# Patient Record
Sex: FEMALE | Race: WHITE | NOT HISPANIC OR LATINO | Employment: UNEMPLOYED | URBAN - METROPOLITAN AREA
[De-identification: names, ages, dates, MRNs, and addresses within clinical notes are randomized per-mention and may not be internally consistent; named-entity substitution may affect disease eponyms.]

---

## 2017-06-30 ENCOUNTER — OFFICE VISIT (OUTPATIENT)
Dept: OBSTETRICS AND GYNECOLOGY | Facility: CLINIC | Age: 42
End: 2017-06-30
Payer: MEDICAID

## 2017-06-30 ENCOUNTER — LAB VISIT (OUTPATIENT)
Dept: LAB | Facility: HOSPITAL | Age: 42
End: 2017-06-30
Attending: OBSTETRICS & GYNECOLOGY
Payer: MEDICAID

## 2017-06-30 VITALS
HEART RATE: 78 BPM | BODY MASS INDEX: 53.92 KG/M2 | WEIGHT: 293 LBS | SYSTOLIC BLOOD PRESSURE: 120 MMHG | DIASTOLIC BLOOD PRESSURE: 82 MMHG | RESPIRATION RATE: 10 BRPM | HEIGHT: 62 IN

## 2017-06-30 DIAGNOSIS — Z01.419 WELL WOMAN EXAM WITH ROUTINE GYNECOLOGICAL EXAM: Primary | ICD-10-CM

## 2017-06-30 DIAGNOSIS — N94.6 DYSMENORRHEA: ICD-10-CM

## 2017-06-30 DIAGNOSIS — Z12.4 CERVICAL CANCER SCREENING: ICD-10-CM

## 2017-06-30 DIAGNOSIS — N92.0 MENORRHAGIA WITH REGULAR CYCLE: ICD-10-CM

## 2017-06-30 DIAGNOSIS — Z11.3 SCREEN FOR STD (SEXUALLY TRANSMITTED DISEASE): ICD-10-CM

## 2017-06-30 DIAGNOSIS — Z12.31 OTHER SCREENING MAMMOGRAM: ICD-10-CM

## 2017-06-30 LAB
BASOPHILS # BLD AUTO: 0.01 K/UL
BASOPHILS NFR BLD: 0.1 %
C TRACH DNA SPEC QL NAA+PROBE: NOT DETECTED
CANDIDA RRNA VAG QL PROBE: NEGATIVE
DIFFERENTIAL METHOD: ABNORMAL
EOSINOPHIL # BLD AUTO: 0.1 K/UL
EOSINOPHIL NFR BLD: 0.9 %
ERYTHROCYTE [DISTWIDTH] IN BLOOD BY AUTOMATED COUNT: 15.7 %
G VAGINALIS RRNA GENITAL QL PROBE: POSITIVE
HCT VFR BLD AUTO: 41.9 %
HGB BLD-MCNC: 13.7 G/DL
LYMPHOCYTES # BLD AUTO: 2.3 K/UL
LYMPHOCYTES NFR BLD: 21.7 %
MCH RBC QN AUTO: 30 PG
MCHC RBC AUTO-ENTMCNC: 32.7 %
MCV RBC AUTO: 92 FL
MONOCYTES # BLD AUTO: 0.5 K/UL
MONOCYTES NFR BLD: 4.8 %
N GONORRHOEA DNA SPEC QL NAA+PROBE: NOT DETECTED
NEUTROPHILS # BLD AUTO: 7.8 K/UL
NEUTROPHILS NFR BLD: 72.5 %
PLATELET # BLD AUTO: 170 K/UL
PMV BLD AUTO: 12.5 FL
RBC # BLD AUTO: 4.56 M/UL
RPR SER QL: NORMAL
T VAGINALIS RRNA GENITAL QL PROBE: POSITIVE
TSH SERPL DL<=0.005 MIU/L-ACNC: 0.58 UIU/ML
WBC # BLD AUTO: 10.75 K/UL

## 2017-06-30 PROCEDURE — 88142 CYTOPATH C/V THIN LAYER: CPT | Performed by: PATHOLOGY

## 2017-06-30 PROCEDURE — 87591 N.GONORRHOEAE DNA AMP PROB: CPT

## 2017-06-30 PROCEDURE — 88141 CYTOPATH C/V INTERPRET: CPT | Mod: ,,, | Performed by: PATHOLOGY

## 2017-06-30 PROCEDURE — 87480 CANDIDA DNA DIR PROBE: CPT

## 2017-06-30 PROCEDURE — 99999 PR PBB SHADOW E&M-EST. PATIENT-LVL III: CPT | Mod: PBBFAC,,, | Performed by: OBSTETRICS & GYNECOLOGY

## 2017-06-30 PROCEDURE — 87624 HPV HI-RISK TYP POOLED RSLT: CPT

## 2017-06-30 PROCEDURE — 99213 OFFICE O/P EST LOW 20 MIN: CPT | Mod: PBBFAC | Performed by: OBSTETRICS & GYNECOLOGY

## 2017-06-30 PROCEDURE — 99396 PREV VISIT EST AGE 40-64: CPT | Mod: S$PBB,,, | Performed by: OBSTETRICS & GYNECOLOGY

## 2017-06-30 NOTE — PROGRESS NOTES
Subjective:    Patient ID: Robert Gordon is a 42 y.o. y.o. female.     Chief Complaint: Annual Well Woman Exam     History of Present Illness:  Robert presents today for Annual Well Woman exam. She describes her menses as very heavy with passage of clots; severe dysmenorrhea associated with nausea and fatigue.She admits to pelvic pain.  She denies breast tenderness, masses, nipple discharge. She denies difficulty with urination or bowel movements. She denies menopausal symptoms such as hotflashes, vaginal dryness, and night sweats. She denies bloating, early satiety, or weight changes. She is sexually active. Contraception is by bilateral tubal ligation.    Patient asymptomatic but requesting STD screening.       Menstrual History:   No LMP recorded..     OB History      Para Term  AB Living    2 2       2    SAB TAB Ectopic Multiple Live Births                       The following portions of the patient's history were reviewed and updated as appropriate: allergies, current medications, past family history, past medical history, past social history, past surgical history and problem list.    ROS:   CONSTITUTIONAL: Negative for fever, chills, diaphoresis, weakness, fatigue, weight loss, weight gain  ENT: negative for sore throat, nasal congestion, nasal discharge, epistaxis, tinnitus, hearing loss  EYES: negative for blurry vision, decreased vision, loss of vision, eye pain, diplopia, photophobia, discharge  SKIN: Negative for rash, itching, hives  RESPIRATORY: negative for cough, hemoptysis, shortness of breath, pleuritic chest pain, wheezing  CARDIOVASCULAR: negative for chest pain, dyspnea on exertion, orthopnea, paroxysmal nocturnal dyspnea, edema, palpitations  BREAST: negative for breast  tenderness, breast mass, nipple discharge, or skin changes  GASTROINTESTINAL: negative for abdominal pain, flank pain, nausea, vomiting, diarrhea, constipation, black stool, blood in stool  GENITOURINARY:  "negative for abnormal vaginal bleeding, amenorrhea, decreased libido, dysuria, genital sores, hematuria, incontinence, menorrhagia, pelvic pain, urinary frequency, vaginal discharge  HEMATOLOGIC/LYMPHATIC: negative for swollen lymph nodes, bleeding, bruising  MUSCULOSKELETAL: negative for back pain, joint pain, joint stiffness, joint swelling, muscle pain, muscle weakness  NEUROLOGICAL: negative for dizzy/vertigo, headache, focal weakness, numbness/tingling, speech problems, loss of consciousness, confusion, memory loss  BEHAVORIAL/PSYCH: negative for anxiety, depression, psychosis  ENDOCRINE: negative for polydipsia/polyuria, palpitations, skin changes, temperature intolerance, unexpected weight changes  ALLERGIC/IMMUNOLOGIC: negative for urticaria, hay fever, angioedema      Objective:    Vital Signs:  Vitals:    06/30/17 1040   BP: 120/82   Pulse: 78   Resp: 10   Weight: (!) 147.9 kg (326 lb)   Height: 5' 2" (1.575 m)         Physical Exam:  General:  alert, cooperative, appears stated age   Skin:  Skin color, texture, turgor normal. No rashes or lesions   HEENT:  conjunctivae/corneas clear. PERRL.   Neck: supple, trachea midline, no adenopathy or thyromegally   Respiratory:  clear to auscultation bilaterally   Heart:  regular rate and rhythm, S1, S2 normal, no murmur, click, rub or gallop   Breasts:  no discharge, erythema, or tenderness   Abdomen:  normal findings: bowel sounds normal, no masses palpable, no organomegaly and soft, non-tender   Pelvis: External genitalia: normal general appearance  Urinary system: urethral meatus normal, bladder nontender  Vaginal: normal mucosa without prolapse or lesions, discharge, white  Cervix: normal appearance  Uterus: normal size, shape, position  Adnexa: normal size, nontender bilaterally   Extremities: Normal ROM; no edema, no cyanosis   Neurologial: Normal strength and tone. No focal numbness or weakness. Reflexes 2+ and equal.   Psychiatric: normal mood, speech, " dress, and thought processes         Assessment:       Healthy female exam.     1. Well woman exam with routine gynecological exam    2. Cervical cancer screening    3. Screen for STD (sexually transmitted disease)    4. Other screening mammogram    5. Menorrhagia with regular cycle    6. Dysmenorrhea          Plan:       Thin prep Pap smear.    HPV cotesting  MMG ordered  Affirm  GC/CT  CBC/TSH  HIV/Hep/RPR  TVUS  RTC for EMB    COUNSELING:  Robert was counseled on STD pevention, use and side-effects of various contraceptive measures, A.C.O.G. Pap guidelines and recommendations for yearly pelvic exams in addition to recommendations for monthly self breast exams; to see her PCP for other health maintenance.

## 2017-07-03 LAB
HAV IGM SERPL QL IA: NEGATIVE
HBV CORE IGM SERPL QL IA: NEGATIVE
HBV SURFACE AG SERPL QL IA: NEGATIVE
HCV AB SERPL QL IA: NEGATIVE
HIV 1+2 AB+HIV1 P24 AG SERPL QL IA: NEGATIVE

## 2017-07-05 ENCOUNTER — TELEPHONE (OUTPATIENT)
Dept: OBSTETRICS AND GYNECOLOGY | Facility: CLINIC | Age: 42
End: 2017-07-05

## 2017-07-05 RX ORDER — METRONIDAZOLE 500 MG/1
TABLET ORAL
Qty: 16 TABLET | Refills: 0 | Status: SHIPPED | OUTPATIENT
Start: 2017-07-05 | End: 2020-09-22

## 2017-07-05 NOTE — TELEPHONE ENCOUNTER
----- Message from Georgie Hassan MA sent at 7/5/2017  2:23 PM CDT -----  Contact: brandt Gordon  MRN: [id number]  Home Phone      [phone]  Work Phone      Not on file.  Mobile          [phone]    Patient Care Team:  Katie Antony NP as PCP - General (Family Medicine)  OB? No  What phone number can you be reached at?   [phone]  Message:   Needs more information on test results.

## 2017-07-07 LAB
HPV HR 12 DNA CVX QL NAA+PROBE: POSITIVE
HPV16 DNA SPEC QL NAA+PROBE: NEGATIVE
HPV18 DNA SPEC QL NAA+PROBE: NEGATIVE

## 2020-08-07 DIAGNOSIS — M25.551 BILATERAL HIP PAIN: Primary | ICD-10-CM

## 2020-08-07 DIAGNOSIS — M25.552 BILATERAL HIP PAIN: Primary | ICD-10-CM

## 2020-09-18 DIAGNOSIS — M25.562 PAIN IN BOTH KNEES, UNSPECIFIED CHRONICITY: ICD-10-CM

## 2020-09-18 DIAGNOSIS — M25.511 BILATERAL SHOULDER PAIN, UNSPECIFIED CHRONICITY: Primary | ICD-10-CM

## 2020-09-18 DIAGNOSIS — M25.561 PAIN IN BOTH KNEES, UNSPECIFIED CHRONICITY: ICD-10-CM

## 2020-09-18 DIAGNOSIS — M25.512 BILATERAL SHOULDER PAIN, UNSPECIFIED CHRONICITY: Primary | ICD-10-CM

## 2020-09-22 ENCOUNTER — OFFICE VISIT (OUTPATIENT)
Dept: ORTHOPEDICS | Facility: CLINIC | Age: 45
End: 2020-09-22
Payer: MEDICAID

## 2020-09-22 ENCOUNTER — HOSPITAL ENCOUNTER (OUTPATIENT)
Dept: RADIOLOGY | Facility: HOSPITAL | Age: 45
Discharge: HOME | End: 2020-09-22
Attending: PHYSICIAN ASSISTANT
Payer: MEDICAID

## 2020-09-22 VITALS
HEIGHT: 62 IN | TEMPERATURE: 97 F | HEART RATE: 90 BPM | DIASTOLIC BLOOD PRESSURE: 86 MMHG | WEIGHT: 293 LBS | SYSTOLIC BLOOD PRESSURE: 134 MMHG | BODY MASS INDEX: 53.92 KG/M2 | RESPIRATION RATE: 18 BRPM

## 2020-09-22 DIAGNOSIS — M25.512 BILATERAL SHOULDER PAIN, UNSPECIFIED CHRONICITY: ICD-10-CM

## 2020-09-22 DIAGNOSIS — M25.511 BILATERAL SHOULDER PAIN, UNSPECIFIED CHRONICITY: ICD-10-CM

## 2020-09-22 DIAGNOSIS — M25.562 PAIN IN BOTH KNEES, UNSPECIFIED CHRONICITY: ICD-10-CM

## 2020-09-22 DIAGNOSIS — M25.561 PAIN IN BOTH KNEES, UNSPECIFIED CHRONICITY: ICD-10-CM

## 2020-09-22 DIAGNOSIS — M25.562 CHRONIC PAIN OF BOTH KNEES: Primary | ICD-10-CM

## 2020-09-22 DIAGNOSIS — M54.2 NECK PAIN: ICD-10-CM

## 2020-09-22 DIAGNOSIS — M25.561 CHRONIC PAIN OF BOTH KNEES: Primary | ICD-10-CM

## 2020-09-22 DIAGNOSIS — M17.0 PRIMARY OSTEOARTHRITIS OF BOTH KNEES: ICD-10-CM

## 2020-09-22 DIAGNOSIS — G89.29 CHRONIC PAIN OF BOTH KNEES: Primary | ICD-10-CM

## 2020-09-22 PROCEDURE — 73564 X-RAY EXAM KNEE 4 OR MORE: CPT | Mod: 26,50,, | Performed by: RADIOLOGY

## 2020-09-22 PROCEDURE — 99204 PR OFFICE/OUTPT VISIT, NEW, LEVL IV, 45-59 MIN: ICD-10-PCS | Mod: S$PBB,,, | Performed by: PHYSICIAN ASSISTANT

## 2020-09-22 PROCEDURE — 99999 PR PBB SHADOW E&M-EST. PATIENT-LVL IV: CPT | Mod: PBBFAC,,, | Performed by: PHYSICIAN ASSISTANT

## 2020-09-22 PROCEDURE — 73030 X-RAY EXAM OF SHOULDER: CPT | Mod: 26,50,, | Performed by: RADIOLOGY

## 2020-09-22 PROCEDURE — 99204 OFFICE O/P NEW MOD 45 MIN: CPT | Mod: S$PBB,,, | Performed by: PHYSICIAN ASSISTANT

## 2020-09-22 PROCEDURE — 73564 X-RAY EXAM KNEE 4 OR MORE: CPT | Mod: TC,50

## 2020-09-22 PROCEDURE — 99999 PR PBB SHADOW E&M-EST. PATIENT-LVL IV: ICD-10-PCS | Mod: PBBFAC,,, | Performed by: PHYSICIAN ASSISTANT

## 2020-09-22 PROCEDURE — 99214 OFFICE O/P EST MOD 30 MIN: CPT | Mod: PBBFAC,25 | Performed by: PHYSICIAN ASSISTANT

## 2020-09-22 PROCEDURE — 73030 XR SHOULDER COMPLETE 2 OR MORE VIEWS BILATERAL: ICD-10-PCS | Mod: 26,50,, | Performed by: RADIOLOGY

## 2020-09-22 PROCEDURE — 73564 XR KNEE ORTHO BILAT WITH FLEXION: ICD-10-PCS | Mod: 26,50,, | Performed by: RADIOLOGY

## 2020-09-22 PROCEDURE — 73030 X-RAY EXAM OF SHOULDER: CPT | Mod: TC,50

## 2020-09-22 RX ORDER — INSULIN GLARGINE 100 [IU]/ML
INJECTION, SOLUTION SUBCUTANEOUS
COMMUNITY
Start: 2020-08-19 | End: 2021-06-09

## 2020-09-22 RX ORDER — DICLOFENAC SODIUM 10 MG/G
4 GEL TOPICAL 4 TIMES DAILY
Qty: 1 TUBE | Refills: 1 | Status: SHIPPED | OUTPATIENT
Start: 2020-09-22 | End: 2021-07-27

## 2020-09-22 RX ORDER — LINAGLIPTIN 5 MG/1
TABLET, FILM COATED ORAL
COMMUNITY
Start: 2020-08-24 | End: 2021-06-09

## 2020-09-22 RX ORDER — GABAPENTIN 100 MG/1
CAPSULE ORAL
COMMUNITY
Start: 2020-09-03 | End: 2020-09-22 | Stop reason: SDUPTHER

## 2020-09-22 RX ORDER — METFORMIN HYDROCHLORIDE 500 MG/1
TABLET, EXTENDED RELEASE ORAL
COMMUNITY
Start: 2020-08-31 | End: 2021-06-09

## 2020-09-22 RX ORDER — NITROGLYCERIN 0.4 MG/1
0.4 TABLET SUBLINGUAL EVERY 5 MIN PRN
COMMUNITY
Start: 2020-07-29

## 2020-09-22 RX ORDER — LOSARTAN POTASSIUM 25 MG/1
TABLET ORAL
COMMUNITY
Start: 2020-07-29 | End: 2021-06-09 | Stop reason: DRUGHIGH

## 2020-09-22 RX ORDER — TRAMADOL HYDROCHLORIDE 50 MG/1
TABLET ORAL
COMMUNITY
Start: 2020-09-18 | End: 2020-10-15

## 2020-09-22 RX ORDER — GABAPENTIN 300 MG/1
CAPSULE ORAL
COMMUNITY
Start: 2020-09-15 | End: 2021-06-09

## 2020-09-22 RX ORDER — KETOROLAC TROMETHAMINE 10 MG/1
TABLET, FILM COATED ORAL
COMMUNITY
Start: 2020-09-15 | End: 2020-09-22

## 2020-09-22 NOTE — LETTER
September 22, 2020      Katie Antony, NP  [address]  [place]  [place] [address] [place] [address] Spec. - Orthopedics  [address] [facility] [address]  Phone: [phone]          Patient: Robert Boo   MR Number: [id number]   YOB: 1975   Date of Visit: 9/22/2020       Dear Katie Antony:    Thank you for referring Robert Boo to me for evaluation. Attached you will find relevant portions of my assessment and plan of care.    If you have questions, please do not hesitate to call me. I look forward to following Robert Boo along with you.    Sincerely,    DANIELLE Watkinsosure  CC:  No Recipients    If you would like to receive this communication electronically, please contact [email or web address] or [phone] to request more information on [employer] access.    For providers and/or their staff who would like to refer a patient to [facility], please contact us through our one-stop-shop provider referral line, [facility] , at [phone].    If you feel you have received this communication in error or would no longer like to receive these types of communications, please e-mail [email or web address]

## 2020-09-22 NOTE — PROGRESS NOTES
Subjective:      Patient ID: Robert Boo is a 45 y.o. female.    Chief Complaint: Pain of the Left Knee, Pain of the Right Knee, Pain of the Right Shoulder, and Pain of the Left Shoulder    Review of patient's allergies indicates:   Allergen Reactions    Penicillins Other (See Comments)     Unknown reaction      44 yo F presents to clinic with c/o bilateral shoulder pain and bilateral knee pain.  No injury or trauma.    Shoulder pain started about 6 months ago.  Also has neck pain that radiates into shoulders and down to fingertips.  Pain is sharp/burning.  Does have some numbness in right hand.  Diagnosed with neuropathy by PCP and started gabapentin which has not provided much relief.  States that she has tried physical therapy in the past with no improvement.    Bilateral knee pain x 2 years.  Pain is sharp/achy and worse with walking and standing, improves some with rest.  Also worse after sitting for prolonged periods of time.  Pain does not radiate, no numbness or tingling in lower extremities.  Sometimes feels like knees will give out.    Taking ibuprofen daily.  Has tried mobic, toradol, tramadol with no relief.      Review of Systems   Constitution: Negative for chills, diaphoresis and fever.   HENT: Negative for congestion, ear discharge and ear pain.    Eyes: Negative for blurred vision, discharge, double vision and pain.   Cardiovascular: Negative for chest pain, claudication and cyanosis.   Respiratory: Negative for cough, hemoptysis and shortness of breath.    Endocrine: Negative for cold intolerance and heat intolerance.   Skin: Negative for color change, dry skin, itching and rash.   Musculoskeletal: Positive for joint pain and neck pain. Negative for arthritis, back pain, falls, gout, joint swelling and muscle weakness.   Gastrointestinal: Negative for abdominal pain and change in bowel habit.   Neurological: Positive for numbness and paresthesias. Negative for brief paralysis, disturbances in  coordination and dizziness.   Psychiatric/Behavioral: Negative for altered mental status and depression.         Objective:          General    Constitutional: She is oriented to person, place, and time. She appears well-developed and well-nourished. No distress.   HENT:   Head: Atraumatic.   Eyes: EOM are normal. Right eye exhibits no discharge. Left eye exhibits no discharge.   Cardiovascular: Normal rate.    Pulmonary/Chest: Effort normal. No respiratory distress.   Abdominal: Soft.   Neurological: She is alert and oriented to person, place, and time.   Psychiatric: She has a normal mood and affect. Her behavior is normal.     General Musculoskeletal Exam   Gait: antalgic       Right Knee Exam     Inspection   Erythema: absent  Scars: absent  Swelling: absent  Effusion: absent  Deformity: absent  Bruising: absent    Crepitus   The patient has crepitus of the patella and medial joint line.    Range of Motion   Extension: 0   Flexion: 130     Tests   Ligament Examination   MCL - Valgus: normal (0 to 2mm)  LCL - Varus: normal    Other   Sensation: normal    Left Knee Exam     Inspection   Erythema: absent  Scars: absent  Swelling: absent  Effusion: absent  Deformity: absent  Bruising: absent    Crepitus   The patient has crepitus of the patella and medial joint line.    Range of Motion   Extension: 0   Flexion: 140     Tests   Stability   MCL - Valgus: normal (0 to 2mm)  LCL - Varus: normal (0 to 2mm)    Other   Sensation: normalBack (L-Spine & T-Spine) / Neck (C-Spine) Exam     Tenderness   The patient is tender to palpation of the right trapezial and left trapezial.   Right Shoulder Exam     Inspection/Observation   Swelling: absent  Bruising: absent  Deformity: absent    Tenderness   The patient is tender to palpation of the supraspinatus.    Range of Motion   Active abduction: 150   Passive abduction: 160   Forward Flexion: 160   Internal rotation 0 degrees: Sacrum     Other   Sensation: normal    Comments:   Incomplete exam due to patient discomfort    Left Shoulder Exam     Inspection/Observation   Swelling: absent  Bruising: absent  Deformity: absent    Tenderness   The patient is tender to palpation of the supraspinatus.    Range of Motion   Active abduction: 130   Passive abduction: 140   Forward Flexion: 120   Internal rotation 0 degrees: Sacrum     Other   Sensation: normal     Comments:  Incomplete exam due to patient discomfort      Muscle Strength   Right Upper Extremity   Shoulder Abduction: 4/5   Left Upper Extremity  Shoulder Abduction: 4/5   Right Lower Extremity   Hip Abduction: 5/5   Quadriceps:  4/5   Hamstrin/5   Left Lower Extremity   Hip Abduction: 5/5   Quadriceps:  4/5   Hamstrin/5     Vascular Exam     Right Pulses  Dorsalis Pedis:      2+    Radial:                    2+      Left Pulses  Dorsalis Pedis:      2+    Radial:                    2+      Capillary Refill  Right Hand: normal capillary refill  Left Hand: normal capillary refill    Edema  Right Lower Leg: absent  Left Lower Leg: absent              Assessment:         Xray Bilateral Shoulders 20:  Right: Mild degenerative changes of the acromioclavicular joint.  The glenohumeral joint space is intact.  No fracture or dislocation.     Left: Mild degenerative changes of the acromioclavicular joint.  The glenohumeral joint space is intact.  No fracture or dislocation.      Xray Right Knee 20:  There is bilateral medial compartment joint space narrowing with subchondral sclerosis.  Minimal dorsal spurring from the patella bilaterally, right greater than left.  No fractures or dislocations.  No joint effusions.    Encounter Diagnoses   Name Primary?    Neck pain     Chronic pain of both knees Yes    Primary osteoarthritis of both knees     Chronic pain of both knees  -     diclofenac sodium (VOLTAREN) 1 % Gel; Apply 4 g topically 4 (four) times daily. As needed. Apply to bilateral knees.  Dispense: 1 Tube; Refill:  1    Neck pain  -     Ambulatory referral/consult to Pain Clinic; Future; Expected date: 09/29/2020    Primary osteoarthritis of both knees  -     diclofenac sodium (VOLTAREN) 1 % Gel; Apply 4 g topically 4 (four) times daily. As needed. Apply to bilateral knees.  Dispense: 1 Tube; Refill: 1               Plan:         The total face-to-face encounter time with this patient was 40 minutes and greater than 50% of of the encounter time was spent counseling the patient, coordinating care, and education regarding the pathology and natural history of her diagnosis. We have discussed a variety of treatment options including medications, injections, physical therapy and other alternative treatments. Pt is requesting CSI, however diabetes is not controlled as she states that recent A1c was 10.7.    I made the decision to obtain old records of the patient including previous notes and imaging. New imaging was ordered today of the extremity or extremities evaluated. I independently reviewed and interpreted the radiographs and/or MRIs today as well as prior imaging.      She was reminded to call the clinic immediately for any adverse side effects as explained in clinic today.    1. Referral to pain management for evaluation and treatment of neck pain.  2. HEP 42672 - I instructed and demonstrated a patellofemoral/periscapular and rotator cuff strengthening HEP. The patient then demonstrated understanding of exercises and proper technique. This program was performed for 10 minutes.   3. Referral to neurology for evaluation of finger numbness and stated diagnosis of neuropathy.  4. Patient will work on controlling diabetes, states that she is starting new medication today. When controlled, we can proceed with knee injections.  5. Voltaren gel topically as prescribed as needed. Patient is aware that she is not to take any other antiinflammatories while taking.  6. Ice compress to the affected area 2-3x a day for 15-20 minutes as  needed for pain management.  7. To ER if symptoms persist/worsen or if develop any new signs or symptoms.  8. RTC in 6 weeks for follow-up, sooner if needed.    Patient voices understanding of and agreement with treatment plan. All of the patient's questions were answered and the patient will contact us if she has any questions or concerns in the interim.

## 2020-10-14 ENCOUNTER — TELEPHONE (OUTPATIENT)
Dept: ORTHOPEDICS | Facility: CLINIC | Age: 45
End: 2020-10-14

## 2020-10-14 NOTE — TELEPHONE ENCOUNTER
----- Message from Francine Stafford sent at 10/14/2020  9:07 AM CDT -----  Contact: PATIENT  Robert Boo  MRN: [id number]  : 1975  PCP: Katie Antony  Home Phone      [phone]  Work Phone      Not on file.  Mobile          [phone]      MESSAGE: Patient states that Romelia prescribed a pain cream for her and it is not working, she is still having severe pain to where she can hardly walk.        Phone: [phone]

## 2020-10-14 NOTE — TELEPHONE ENCOUNTER
Pt states that she has had a recent A1C but states that she discussed with provider during last visit that she does not want any steroid injections. Please advise.

## 2020-10-15 DIAGNOSIS — M25.562 CHRONIC PAIN OF BOTH KNEES: Primary | ICD-10-CM

## 2020-10-15 DIAGNOSIS — G89.29 CHRONIC PAIN OF BOTH KNEES: Primary | ICD-10-CM

## 2020-10-15 DIAGNOSIS — M25.561 CHRONIC PAIN OF BOTH KNEES: Primary | ICD-10-CM

## 2020-10-15 DIAGNOSIS — M17.0 PRIMARY OSTEOARTHRITIS OF BOTH KNEES: ICD-10-CM

## 2020-10-15 RX ORDER — TRAMADOL HYDROCHLORIDE 50 MG/1
50 TABLET ORAL EVERY 8 HOURS PRN
Qty: 12 TABLET | Refills: 0 | Status: SHIPPED | OUTPATIENT
Start: 2020-10-15 | End: 2021-06-09

## 2020-10-15 NOTE — TELEPHONE ENCOUNTER
Offered pt the short course of tramdol, pt states she will take that but she knows that it will not help. Then offered PT and pt states that she will not try PT because she knows that it will make her feel worse. Informed pt that she should contact PCP for further pain treatment options.

## 2020-11-19 ENCOUNTER — TELEPHONE (OUTPATIENT)
Dept: PAIN MEDICINE | Facility: CLINIC | Age: 45
End: 2020-11-19

## 2020-11-19 NOTE — TELEPHONE ENCOUNTER
Patient was notified that our clinic is not currently accepting medicaid for pain management. Understanding was voiced.

## 2020-11-19 NOTE — TELEPHONE ENCOUNTER
----- Message from Elo Cortés sent at 11/19/2020  1:56 PM CST -----  Regarding: Patient Call Back  Who Called: BARRY GONZALES [[id number]]    What is the request in detail: Would like a call back in regards to getting scheduled referral in patient Epic.    Can the clinic reply by  [facility]? No     Best Call Back Number: [phone]

## 2021-02-09 ENCOUNTER — TELEPHONE (OUTPATIENT)
Dept: ORTHOPEDICS | Facility: CLINIC | Age: 46
End: 2021-02-09

## 2021-07-27 ENCOUNTER — OFFICE VISIT (OUTPATIENT)
Dept: ORTHOPEDICS | Facility: CLINIC | Age: 46
End: 2021-07-27
Payer: MEDICAID

## 2021-07-27 VITALS
DIASTOLIC BLOOD PRESSURE: 70 MMHG | OXYGEN SATURATION: 98 % | WEIGHT: 293 LBS | BODY MASS INDEX: 50.02 KG/M2 | RESPIRATION RATE: 20 BRPM | HEART RATE: 83 BPM | HEIGHT: 64 IN | SYSTOLIC BLOOD PRESSURE: 122 MMHG

## 2021-07-27 DIAGNOSIS — M17.0 PRIMARY OSTEOARTHRITIS OF BOTH KNEES: Primary | ICD-10-CM

## 2021-07-27 PROCEDURE — 99213 OFFICE O/P EST LOW 20 MIN: CPT | Mod: S$PBB,,, | Performed by: PHYSICIAN ASSISTANT

## 2021-07-27 PROCEDURE — 99213 PR OFFICE/OUTPT VISIT, EST, LEVL III, 20-29 MIN: ICD-10-PCS | Mod: S$PBB,,, | Performed by: PHYSICIAN ASSISTANT

## 2021-07-27 PROCEDURE — 99999 PR PBB SHADOW E&M-EST. PATIENT-LVL IV: ICD-10-PCS | Mod: PBBFAC,,, | Performed by: PHYSICIAN ASSISTANT

## 2021-07-27 PROCEDURE — 99999 PR PBB SHADOW E&M-EST. PATIENT-LVL IV: CPT | Mod: PBBFAC,,, | Performed by: PHYSICIAN ASSISTANT

## 2021-07-27 PROCEDURE — 99214 OFFICE O/P EST MOD 30 MIN: CPT | Mod: PBBFAC | Performed by: PHYSICIAN ASSISTANT

## 2021-07-27 RX ORDER — GABAPENTIN 100 MG/1
CAPSULE ORAL
COMMUNITY

## 2021-07-27 RX ORDER — BUSPIRONE HYDROCHLORIDE 10 MG/1
TABLET ORAL
COMMUNITY

## 2021-08-21 ENCOUNTER — HOSPITAL ENCOUNTER (EMERGENCY)
Facility: HOSPITAL | Age: 46
Discharge: HOME | End: 2021-08-21
Attending: EMERGENCY MEDICINE
Payer: MEDICAID

## 2021-08-21 VITALS
OXYGEN SATURATION: 97 % | SYSTOLIC BLOOD PRESSURE: 168 MMHG | RESPIRATION RATE: 18 BRPM | HEART RATE: 76 BPM | DIASTOLIC BLOOD PRESSURE: 91 MMHG | TEMPERATURE: 97 F

## 2021-08-21 DIAGNOSIS — B34.9 VIRAL SYNDROME: Primary | ICD-10-CM

## 2021-08-21 PROCEDURE — U0005 INFEC AGEN DETEC AMPLI PROBE: HCPCS | Performed by: EMERGENCY MEDICINE

## 2021-08-21 PROCEDURE — U0003 INFECTIOUS AGENT DETECTION BY NUCLEIC ACID (DNA OR RNA); SEVERE ACUTE RESPIRATORY SYNDROME CORONAVIRUS 2 (SARS-COV-2) (CORONAVIRUS DISEASE [COVID-19]), AMPLIFIED PROBE TECHNIQUE, MAKING USE OF HIGH THROUGHPUT TECHNOLOGIES AS DESCRIBED BY CMS-2020-01-R: HCPCS | Performed by: EMERGENCY MEDICINE

## 2021-08-21 PROCEDURE — 99283 EMERGENCY DEPT VISIT LOW MDM: CPT

## 2021-08-21 RX ORDER — ALBUTEROL SULFATE 90 UG/1
1-2 AEROSOL, METERED RESPIRATORY (INHALATION) EVERY 6 HOURS PRN
Qty: 6.7 G | Refills: 0 | Status: SHIPPED | OUTPATIENT
Start: 2021-08-21 | End: 2022-08-21

## 2021-08-22 LAB
SARS-COV-2 RNA RESP QL NAA+PROBE: NOT DETECTED
SARS-COV-2- CYCLE NUMBER: -1

## 2021-08-25 ENCOUNTER — TELEPHONE (OUTPATIENT)
Dept: ORTHOPEDICS | Facility: CLINIC | Age: 46
End: 2021-08-25

## 2021-09-28 ENCOUNTER — TELEPHONE (OUTPATIENT)
Dept: NEUROLOGY | Facility: CLINIC | Age: 46
End: 2021-09-28

## 2021-10-29 PROBLEM — L03.311 CELLULITIS OF ABDOMINAL WALL: Status: ACTIVE | Noted: 2021-10-29

## 2021-10-29 PROBLEM — L02.91 ABSCESS: Status: ACTIVE | Noted: 2021-10-29

## 2021-10-29 PROBLEM — N30.00 ACUTE CYSTITIS WITHOUT HEMATURIA: Status: ACTIVE | Noted: 2021-10-29

## 2022-04-27 ENCOUNTER — HOSPITAL ENCOUNTER (EMERGENCY)
Facility: HOSPITAL | Age: 47
Discharge: HOME | End: 2022-04-27
Attending: SURGERY
Payer: MEDICAID

## 2022-04-27 VITALS
HEART RATE: 84 BPM | SYSTOLIC BLOOD PRESSURE: 213 MMHG | WEIGHT: 293 LBS | BODY MASS INDEX: 50.9 KG/M2 | RESPIRATION RATE: 16 BRPM | DIASTOLIC BLOOD PRESSURE: 101 MMHG | OXYGEN SATURATION: 99 % | TEMPERATURE: 98 F

## 2022-04-27 DIAGNOSIS — M25.559 HIP PAIN: ICD-10-CM

## 2022-04-27 DIAGNOSIS — F43.9 STRESS: Primary | ICD-10-CM

## 2022-04-27 DIAGNOSIS — M51.37 DEGENERATIVE DISC DISEASE AT L5-S1 LEVEL: ICD-10-CM

## 2022-04-27 PROCEDURE — 96372 THER/PROPH/DIAG INJ SC/IM: CPT | Performed by: SURGERY

## 2022-04-27 PROCEDURE — 63600175 PHARM REV CODE 636 W HCPCS: Performed by: SURGERY

## 2022-04-27 PROCEDURE — 99284 EMERGENCY DEPT VISIT MOD MDM: CPT | Mod: 25

## 2022-04-27 RX ORDER — TRAMADOL HYDROCHLORIDE 50 MG/1
50 TABLET ORAL EVERY 6 HOURS PRN
Qty: 20 TABLET | Refills: 0 | Status: SHIPPED | OUTPATIENT
Start: 2022-04-27 | End: 2022-04-29

## 2022-04-27 RX ORDER — ONDANSETRON 2 MG/ML
4 INJECTION INTRAMUSCULAR; INTRAVENOUS
Status: COMPLETED | OUTPATIENT
Start: 2022-04-27 | End: 2022-04-27

## 2022-04-27 RX ORDER — MEPERIDINE HYDROCHLORIDE 25 MG/ML
25 INJECTION INTRAMUSCULAR; INTRAVENOUS; SUBCUTANEOUS
Status: COMPLETED | OUTPATIENT
Start: 2022-04-27 | End: 2022-04-27

## 2022-04-27 RX ORDER — CHLORZOXAZONE 500 MG/1
500 TABLET ORAL 4 TIMES DAILY PRN
Qty: 30 TABLET | Refills: 0 | Status: SHIPPED | OUTPATIENT
Start: 2022-04-27

## 2022-04-27 RX ADMIN — ONDANSETRON HYDROCHLORIDE 4 MG: 2 SOLUTION INTRAMUSCULAR; INTRAVENOUS at 08:04

## 2022-04-27 RX ADMIN — MEPERIDINE HYDROCHLORIDE 25 MG: 25 INJECTION INTRAMUSCULAR; INTRAVENOUS; SUBCUTANEOUS at 08:04

## 2022-04-28 ENCOUNTER — TELEPHONE (OUTPATIENT)
Dept: SPINE | Facility: CLINIC | Age: 47
End: 2022-04-28
Payer: MEDICAID

## 2022-04-28 NOTE — TELEPHONE ENCOUNTER
From [facility].  No new Medicaid appointments available.  Patient refused Medicaid assistance number.

## 2022-04-28 NOTE — ED PROVIDER NOTES
Encounter Date: 2022       History     Chief Complaint   Patient presents with    Hip Pain     Pt c/o let hip pain.  Pt being treated by PCP for hip arthritis, and is awaiting consult with ortho.  Pt seen at [facility] for same c/c a few days ago.  Pt called PCP and is awaiting a call back.  Pain on palpation to left hip/back area.  Denies CP, SOB.  +PMS to lower extremities.       Robert Boo is a 46 y.o. female presents with chronic low back pain today  Patient has chronic low back pain issues, ambulatory referral to surgery  Patient's primary care physician Abhilash put a referral in for [facility] does not do spine evaluations, referral was declined    Patient has chronic low back pain and left hip pain for several months   Denies any new trauma or fall, the patient is morbidly obese on exam  Has no neurologic deficits with no signs of cauda equina syndrome  Patient is requesting a new referral and pain control in the ER tonight  Patient states this has been very stressful, she needs ortho referral          Review of patient's allergies indicates:   Allergen Reactions    Adhesive     Duloxetine     Escitalopram oxalate     Iodinated contrast media     Opioids - morphine analogues     Penicillins Other (See Comments)     Unknown reaction     Past Medical History:   Diagnosis Date    Diabetes     HTN (hypertension)     Thyroid disease      Past Surgical History:   Procedure Laterality Date    abcess removal      breast abcess       SECTION      2    CHOLECYSTECTOMY      INCISION AND DRAINAGE OF ABDOMEN Right 10/29/2021    Procedure: INCISION AND DRAINAGE, ABDOMEN;  Surgeon: Denzel Lee MD;  Location: [facility] OR;  Service: General;  Laterality: Right;    TUBAL LIGATION       Family History   Problem Relation Age of Onset    Cancer Maternal Grandmother         lung    Ovarian cancer Sister 29    Breast cancer Neg Hx     Colon cancer Neg Hx      Social History     Tobacco  Use    Smoking status: Current Every Day Smoker     Packs/day: 1.00     Years: 24.00     Pack years: 24.00    Smokeless tobacco: Never Used   Substance Use Topics    Alcohol use: Yes     Comment: socially    Drug use: No     Review of Systems   Constitutional: Negative.    HENT: Negative.    Eyes: Negative.    Respiratory: Negative.    Cardiovascular: Negative.    Gastrointestinal: Negative.    Genitourinary: Negative.    Musculoskeletal: Positive for back pain.   Skin: Negative.    Neurological: Negative.    Psychiatric/Behavioral: Negative.        Physical Exam     Initial Vitals [04/27/22 2007]   BP Pulse Resp Temp SpO2   (!) 213/101 84 16 97.6 °F (36.4 °C) 99 %      MAP       --         Physical Exam    Vitals reviewed.  Constitutional: She appears well-developed and well-nourished.   HENT:   Head: Normocephalic and atraumatic.   Right Ear: External ear normal.   Left Ear: External ear normal.   Nose: Nose normal.   Mouth/Throat: Oropharynx is clear and moist.   Eyes: Conjunctivae and EOM are normal. Pupils are equal, round, and reactive to light.   Neck: Neck supple.   Normal range of motion.  Cardiovascular: Normal rate, regular rhythm, normal heart sounds and intact distal pulses.   Pulmonary/Chest: Breath sounds normal.   Abdominal: Abdomen is soft. Bowel sounds are normal.   Musculoskeletal:      Cervical back: Normal range of motion and neck supple.     Neurological: She is alert and oriented to person, place, and time. She has normal reflexes.   Skin: Skin is warm. Capillary refill takes less than 2 seconds.         ED Course   Procedures  Labs Reviewed - No data to display       Imaging Results    None          Medications   ondansetron injection 4 mg (has no administration in time range)   meperidine (PF) injection 25 mg (has no administration in time range)     Medical Decision Making:   Initial Assessment:   Chronic low back pain and left hip pain on ER assessment  No new findings, no new injury,  no obvious bruising on evaluation    Differential Diagnosis:   Sprain, strain, contusion, fracture, dislocation    ED Management:  Patient given injection for pain control emergency room today  I personally put an ambulatory referral in for orthopedic evaluation  Ultram for pain, Parafon forte today muscle relaxers needed  Follow-up with PCP, no heavy lifting, return as needed on discharge                      Clinical Impression:   Final diagnoses:  [M25.559] Hip pain  [M51.37] Degenerative disc disease at L5-S1 level  [F43.9] Stress (Primary)          ED Disposition Condition    Discharge Stable        ED Prescriptions     Medication Sig Dispense Start Date End Date Auth. Provider    traMADoL (ULTRAM) 50 mg tablet Take 1 tablet (50 mg total) by mouth every 6 (six) hours as needed for Pain. 20 tablet 4/27/2022 4/29/2022 Zander Michael MD    chlorzoxazone (PARAFON FORTE) 500 mg Tab Take 1 tablet (500 mg total) by mouth 4 (four) times daily as needed (muscle spasms). 30 tablet 4/27/2022  Zander Michael MD        Follow-up Information     Follow up With Specialties Details Why Contact Info    Randolph Hung MD Family Medicine Schedule an appointment as soon as possible for a visit in 2 days  [address]  [facility]  [place] [address]  [phone]             Zadner Michael MD  04/27/22 2020

## 2022-05-10 ENCOUNTER — HOSPITAL ENCOUNTER (EMERGENCY)
Facility: HOSPITAL | Age: 47
Discharge: HOME | End: 2022-05-10
Attending: STUDENT IN AN ORGANIZED HEALTH CARE EDUCATION/TRAINING PROGRAM
Payer: MEDICAID

## 2022-05-10 VITALS
RESPIRATION RATE: 20 BRPM | OXYGEN SATURATION: 98 % | WEIGHT: 293 LBS | TEMPERATURE: 99 F | SYSTOLIC BLOOD PRESSURE: 150 MMHG | DIASTOLIC BLOOD PRESSURE: 78 MMHG | BODY MASS INDEX: 54.69 KG/M2 | HEART RATE: 83 BPM

## 2022-05-10 DIAGNOSIS — Z51.89 WOUND CHECK, ABSCESS: Primary | ICD-10-CM

## 2022-05-10 PROCEDURE — 99281 EMR DPT VST MAYX REQ PHY/QHP: CPT

## 2022-05-11 NOTE — ED PROVIDER NOTES
Encounter Date: 5/10/2022       History     Chief Complaint   Patient presents with    Wound Check     Pt had abscess I&D at [facility] yesterday. Pt removed packing at noon and took a shower.  Pt here for wound check.  Pt unable to get a hold of Dr. White for guidance.       46-year-old female with history of diabetes and hypertension presenting for a wound check.  Patient had a left thigh abscess, and had an incision and drainage yesterday at [facility].  Patient reports that she went home, and had a shower per her discharge instructions.  Patient states she was told to remove the wound packing, and then became concerned because there was more than she was expecting.  Unable to get a hold of surgeon for guidance, patient sought help with the emergency department.  Patient denies any fever, nausea, vomiting.  Denies significant drainage from the wound, or worsening redness or pain.  Patient states she has a call with wound care tomorrow.        Review of patient's allergies indicates:   Allergen Reactions    Adhesive     Duloxetine     Escitalopram oxalate     Iodinated contrast media     Opioids - morphine analogues     Penicillins Other (See Comments)     Unknown reaction     Past Medical History:   Diagnosis Date    Diabetes     HTN (hypertension)     Thyroid disease      Past Surgical History:   Procedure Laterality Date    abcess removal      breast abcess       SECTION      2    CHOLECYSTECTOMY      INCISION AND DRAINAGE OF ABDOMEN Right 10/29/2021    Procedure: INCISION AND DRAINAGE, ABDOMEN;  Surgeon: Denzel Lee MD;  Location: [facility] OR;  Service: General;  Laterality: Right;    INCISION AND DRAINAGE OF ABSCESS N/A 2022    Procedure: INCISION AND DRAINAGE, ABSCESS;  Surgeon: Wayne White MD;  Location: [facility] OR;  Service: General;  Laterality: N/A;    TUBAL LIGATION       Family History   Problem Relation Age of Onset    Cancer Maternal Grandmother         lung    Ovarian cancer Sister  29    Breast cancer Neg Hx     Colon cancer Neg Hx      Social History     Tobacco Use    Smoking status: Current Every Day Smoker     Packs/day: 1.00     Years: 24.00     Pack years: 24.00    Smokeless tobacco: Never Used   Substance Use Topics    Alcohol use: Yes     Comment: socially    Drug use: No     Review of Systems   Constitutional: Negative for fever.   HENT: Negative for sore throat.    Respiratory: Negative for shortness of breath.    Cardiovascular: Negative for chest pain.   Gastrointestinal: Negative for nausea.   Genitourinary: Negative for dysuria.   Musculoskeletal: Negative for back pain.        Left thigh/groin abscess   Skin: Negative for rash.   Neurological: Negative for weakness.   Hematological: Does not bruise/bleed easily.       Physical Exam     Initial Vitals [05/10/22 2014]   BP Pulse Resp Temp SpO2   (!) 173/84 83 18 97.7 °F (36.5 °C) 99 %      MAP       --         Physical Exam    Nursing note and vitals reviewed.  Constitutional: She appears well-developed.   HENT:   Head: Normocephalic.   Eyes: Pupils are equal, round, and reactive to light.   Neck:   Normal range of motion.  Cardiovascular:   No murmur heard.  Pulmonary/Chest: No respiratory distress.   Abdominal: Abdomen is soft.   Musculoskeletal:         General: No edema.      Cervical back: Normal range of motion.      Comments: Marsupialized 3x 2 cm wound to left upper thigh/groin.  Minimal active drainage.  No erythema.  No increased warmth or induration or fluctuance.     Neurological: She is alert.   Skin: Skin is warm.   Psychiatric: She has a normal mood and affect.         ED Course   Procedures  Labs Reviewed - No data to display       Imaging Results    None          Medications - No data to display  Medical Decision Making:   Differential Diagnosis:   Wound check.  No signs of significant worsening infection, locally or systemically.  Wound appears to be draining well, and patient is in no significant  discomfort.  Patient has check in with wound care tomorrow, will discharge and she will check in with them.  Patient knows to return to this emergency department for further evaluation if any signs of worsening infection occur, or if she is unable to reach wound care.                          Clinical Impression:   Final diagnoses:  [Z51.89] Wound check, abscess (Primary)          ED Disposition Condition    Discharge Stable        ED Prescriptions     None        Follow-up Information     Follow up With Specialties Details Why Contact Info    Randolph Hung MD Family Medicine Schedule an appointment as soon as possible for a visit in 2 days  [address]  [facility]  [place] [address]  [phone]      [facility] - Emergency Dept Emergency Medicine  If symptoms worsen [address] [facility] [address]  [phone]           Abram Hooker MD  05/10/22 3222

## 2022-05-11 NOTE — ED NOTES
WOUND CLEANSED WITH NORMAL SALINE. PAT DRY. ABD PAD APPLIED. SECURED WITH PAPER TAPE. PT EDUCATED ON WOUND CARE. PT EDUCATED ON WOUND CARE FOLLOW UP. PT EDUCATED ON SIGNS/SYMPTOMS OF INFECTION. PT VERBALIZED UNDERSTANDING.

## 2022-09-21 PROBLEM — E11.65 TYPE 2 DIABETES MELLITUS WITH HYPERGLYCEMIA, WITH LONG-TERM CURRENT USE OF INSULIN: Status: ACTIVE | Noted: 2022-09-21

## 2022-09-21 PROBLEM — Z79.4 TYPE 2 DIABETES MELLITUS WITH HYPERGLYCEMIA, WITH LONG-TERM CURRENT USE OF INSULIN: Status: ACTIVE | Noted: 2022-09-21

## 2023-11-21 ENCOUNTER — TELEPHONE (OUTPATIENT)
Dept: PSYCHIATRY | Facility: CLINIC | Age: 48
End: 2023-11-21
Payer: MEDICAID
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
## 2024-03-26 PROBLEM — N61.1 BREAST ABSCESS: Status: ACTIVE | Noted: 2024-03-26
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
